# Patient Record
Sex: FEMALE | Race: WHITE | HISPANIC OR LATINO | Employment: PART TIME | URBAN - METROPOLITAN AREA
[De-identification: names, ages, dates, MRNs, and addresses within clinical notes are randomized per-mention and may not be internally consistent; named-entity substitution may affect disease eponyms.]

---

## 2023-05-24 ENCOUNTER — OFFICE VISIT (OUTPATIENT)
Dept: ENDOCRINOLOGY | Age: 28
End: 2023-05-24
Payer: OTHER GOVERNMENT

## 2023-05-24 VITALS
HEART RATE: 67 BPM | OXYGEN SATURATION: 98 % | HEIGHT: 67 IN | SYSTOLIC BLOOD PRESSURE: 114 MMHG | BODY MASS INDEX: 28.56 KG/M2 | DIASTOLIC BLOOD PRESSURE: 64 MMHG | WEIGHT: 182 LBS

## 2023-05-24 DIAGNOSIS — E22.1 HYPERPROLACTINEMIA: Primary | ICD-10-CM

## 2023-05-24 RX ORDER — LAMOTRIGINE 25 MG/1
TABLET ORAL
COMMUNITY
Start: 2023-05-09

## 2023-05-24 NOTE — PROGRESS NOTES
Referring provider: Brian Fox MD     Reason for consult:  Hyperprolactinemia    HPI:   - 28 year old female here for hyperprolactinemia  - States she has had elevated prolactin since 2014  - She also states she had a pituitary MRI but her most recent pituitary MRI in 2020 did not show a pituitary abnormalities  - She states she has been on and off cabergoline since 2014 but she stopped taking it 2 months ago  - She states her menstrual cycles have always been irregular  - She has had galactorrhea in the past but is not having it now  - She takes no supplements and her only medication is Lamictal  - She does have one child who is 3 and she  him for 3 months    The following portions of the patient's history were reviewed and updated as appropriate: allergies, current medications, past family history, past medical history, past social history, past surgical history and problem list.    Review of Systems   Genitourinary: Positive for menstrual problem.       Objective     Vitals:    05/24/23 1449   BP: 114/64   Pulse: 67   SpO2: 98%        Physical Exam  Constitutional:       Appearance: Normal appearance.   HENT:      Head: Normocephalic and atraumatic.   Eyes:      General: No scleral icterus.  Pulmonary:      Effort: Pulmonary effort is normal. No respiratory distress.   Neurological:      Mental Status: She is alert.      Gait: Gait normal.   Psychiatric:         Mood and Affect: Mood normal.         Behavior: Behavior normal.         Thought Content: Thought content normal.         Judgment: Judgment normal.       Labs/Imaging:  Prolactin level was 37, MRI from 2020 showed no pituitay abnormality    Assessment & Plan   1. Hyperprolactinemia  - Prolactin is mildly elevated  - Prolactin is typically much high in prolactinomas  - Will check TSH, free T4, LH, FSH, estradiol, macroprolactin    - Follow-up appt. Will depend on lab work

## 2023-05-25 ENCOUNTER — PATIENT ROUNDING (BHMG ONLY) (OUTPATIENT)
Dept: ENDOCRINOLOGY | Age: 28
End: 2023-05-25
Payer: OTHER GOVERNMENT

## 2023-06-08 LAB
ESTRADIOL SERPL-MCNC: 38.4 PG/ML
FSH SERPL-ACNC: 4.6 MIU/ML
LH SERPL-ACNC: 7.5 MIU/ML
MACROPROLACTIN/PROLACTIN MFR SERPL: 75 %
PROLACTIN SERPL-MCNC: 28.4 NG/ML
PROLACTIN.MONOMERIC SERPL-MCNC: 7.02 NG/ML
T4 FREE SERPL-MCNC: 1.15 NG/DL (ref 0.82–1.77)
TSH SERPL DL<=0.005 MIU/L-ACNC: 0.56 UIU/ML (ref 0.45–4.5)